# Patient Record
Sex: MALE | Race: OTHER | URBAN - METROPOLITAN AREA
[De-identification: names, ages, dates, MRNs, and addresses within clinical notes are randomized per-mention and may not be internally consistent; named-entity substitution may affect disease eponyms.]

---

## 2018-05-03 PROBLEM — Z00.00 ENCOUNTER FOR PREVENTIVE HEALTH EXAMINATION: Status: ACTIVE | Noted: 2018-05-03

## 2019-09-24 ENCOUNTER — INPATIENT (INPATIENT)
Facility: HOSPITAL | Age: 74
LOS: 1 days | Discharge: HOME | DRG: 313 | End: 2019-09-26
Attending: FAMILY MEDICINE | Admitting: FAMILY MEDICINE
Payer: MEDICARE

## 2019-09-24 VITALS — HEIGHT: 68 IN | WEIGHT: 169.98 LBS

## 2019-09-24 DIAGNOSIS — R07.9 CHEST PAIN, UNSPECIFIED: ICD-10-CM

## 2019-09-24 LAB
ALBUMIN SERPL ELPH-MCNC: 3.7 G/DL — SIGNIFICANT CHANGE UP (ref 3.3–5)
ALP SERPL-CCNC: 84 U/L — SIGNIFICANT CHANGE UP (ref 40–120)
ALT FLD-CCNC: 29 U/L — SIGNIFICANT CHANGE UP (ref 12–78)
ANION GAP SERPL CALC-SCNC: 11 MMOL/L — SIGNIFICANT CHANGE UP (ref 5–17)
APPEARANCE UR: CLEAR — SIGNIFICANT CHANGE UP
APTT BLD: 30.7 SEC — SIGNIFICANT CHANGE UP (ref 27.5–36.3)
AST SERPL-CCNC: 24 U/L — SIGNIFICANT CHANGE UP (ref 15–37)
BILIRUB SERPL-MCNC: 0.5 MG/DL — SIGNIFICANT CHANGE UP (ref 0.2–1.2)
BILIRUB UR-MCNC: NEGATIVE — SIGNIFICANT CHANGE UP
BUN SERPL-MCNC: 25 MG/DL — HIGH (ref 7–23)
CALCIUM SERPL-MCNC: 9.4 MG/DL — SIGNIFICANT CHANGE UP (ref 8.5–10.1)
CHLORIDE SERPL-SCNC: 106 MMOL/L — SIGNIFICANT CHANGE UP (ref 96–108)
CO2 SERPL-SCNC: 23 MMOL/L — SIGNIFICANT CHANGE UP (ref 22–31)
COLOR SPEC: YELLOW — SIGNIFICANT CHANGE UP
CREAT SERPL-MCNC: 1.38 MG/DL — HIGH (ref 0.5–1.3)
DIFF PNL FLD: NEGATIVE — SIGNIFICANT CHANGE UP
GLUCOSE SERPL-MCNC: 123 MG/DL — HIGH (ref 70–99)
GLUCOSE UR QL: NEGATIVE MG/DL — SIGNIFICANT CHANGE UP
HCT VFR BLD CALC: 43.4 % — SIGNIFICANT CHANGE UP (ref 39–50)
HGB BLD-MCNC: 14.4 G/DL — SIGNIFICANT CHANGE UP (ref 13–17)
INR BLD: 0.95 RATIO — SIGNIFICANT CHANGE UP (ref 0.88–1.16)
KETONES UR-MCNC: NEGATIVE — SIGNIFICANT CHANGE UP
LEUKOCYTE ESTERASE UR-ACNC: NEGATIVE — SIGNIFICANT CHANGE UP
MCHC RBC-ENTMCNC: 33.2 GM/DL — SIGNIFICANT CHANGE UP (ref 32–36)
MCHC RBC-ENTMCNC: 33.6 PG — SIGNIFICANT CHANGE UP (ref 27–34)
MCV RBC AUTO: 101.4 FL — HIGH (ref 80–100)
NITRITE UR-MCNC: NEGATIVE — SIGNIFICANT CHANGE UP
PH UR: 7 — SIGNIFICANT CHANGE UP (ref 5–8)
PLATELET # BLD AUTO: 114 K/UL — LOW (ref 150–400)
POTASSIUM SERPL-MCNC: 3.9 MMOL/L — SIGNIFICANT CHANGE UP (ref 3.5–5.3)
POTASSIUM SERPL-SCNC: 3.9 MMOL/L — SIGNIFICANT CHANGE UP (ref 3.5–5.3)
PROT SERPL-MCNC: 7.1 GM/DL — SIGNIFICANT CHANGE UP (ref 6–8.3)
PROT UR-MCNC: 15 MG/DL
PROTHROM AB SERPL-ACNC: 10.5 SEC — SIGNIFICANT CHANGE UP (ref 10–12.9)
RBC # BLD: 4.28 M/UL — SIGNIFICANT CHANGE UP (ref 4.2–5.8)
RBC # FLD: 13.9 % — SIGNIFICANT CHANGE UP (ref 10.3–14.5)
SODIUM SERPL-SCNC: 140 MMOL/L — SIGNIFICANT CHANGE UP (ref 135–145)
SP GR SPEC: 1.01 — SIGNIFICANT CHANGE UP (ref 1.01–1.02)
TROPONIN I SERPL-MCNC: 0.18 NG/ML — HIGH (ref 0.01–0.04)
UROBILINOGEN FLD QL: NEGATIVE MG/DL — SIGNIFICANT CHANGE UP
WBC # BLD: 6.46 K/UL — SIGNIFICANT CHANGE UP (ref 3.8–10.5)
WBC # FLD AUTO: 6.46 K/UL — SIGNIFICANT CHANGE UP (ref 3.8–10.5)

## 2019-09-24 PROCEDURE — 93010 ELECTROCARDIOGRAM REPORT: CPT

## 2019-09-24 PROCEDURE — 84484 ASSAY OF TROPONIN QUANT: CPT

## 2019-09-24 PROCEDURE — 86803 HEPATITIS C AB TEST: CPT

## 2019-09-24 PROCEDURE — 93018 CV STRESS TEST I&R ONLY: CPT

## 2019-09-24 PROCEDURE — 83036 HEMOGLOBIN GLYCOSYLATED A1C: CPT

## 2019-09-24 PROCEDURE — 93016 CV STRESS TEST SUPVJ ONLY: CPT

## 2019-09-24 PROCEDURE — 80048 BASIC METABOLIC PNL TOTAL CA: CPT

## 2019-09-24 PROCEDURE — 93306 TTE W/DOPPLER COMPLETE: CPT | Mod: 26

## 2019-09-24 PROCEDURE — 85025 COMPLETE CBC W/AUTO DIFF WBC: CPT

## 2019-09-24 PROCEDURE — 93017 CV STRESS TEST TRACING ONLY: CPT

## 2019-09-24 PROCEDURE — 70450 CT HEAD/BRAIN W/O DYE: CPT | Mod: 26

## 2019-09-24 PROCEDURE — 93306 TTE W/DOPPLER COMPLETE: CPT

## 2019-09-24 PROCEDURE — 71045 X-RAY EXAM CHEST 1 VIEW: CPT | Mod: 26

## 2019-09-24 PROCEDURE — A9500: CPT

## 2019-09-24 PROCEDURE — 78452 HT MUSCLE IMAGE SPECT MULT: CPT

## 2019-09-24 PROCEDURE — 36415 COLL VENOUS BLD VENIPUNCTURE: CPT

## 2019-09-24 PROCEDURE — 85027 COMPLETE CBC AUTOMATED: CPT

## 2019-09-24 PROCEDURE — 82962 GLUCOSE BLOOD TEST: CPT

## 2019-09-24 PROCEDURE — 80053 COMPREHEN METABOLIC PANEL: CPT

## 2019-09-24 RX ORDER — HEPARIN SODIUM 5000 [USP'U]/ML
5000 INJECTION INTRAVENOUS; SUBCUTANEOUS
Refills: 0 | Status: DISCONTINUED | OUTPATIENT
Start: 2019-09-24 | End: 2019-09-26

## 2019-09-24 RX ORDER — SODIUM CHLORIDE 9 MG/ML
1000 INJECTION INTRAMUSCULAR; INTRAVENOUS; SUBCUTANEOUS
Refills: 0 | Status: DISCONTINUED | OUTPATIENT
Start: 2019-09-24 | End: 2019-09-25

## 2019-09-24 RX ORDER — ASPIRIN/CALCIUM CARB/MAGNESIUM 324 MG
325 TABLET ORAL ONCE
Refills: 0 | Status: COMPLETED | OUTPATIENT
Start: 2019-09-24 | End: 2019-09-24

## 2019-09-24 RX ADMIN — Medication 325 MILLIGRAM(S): at 21:54

## 2019-09-24 NOTE — ED ADULT TRIAGE NOTE - BMI (KG/M2)
Pt calling stating she never heard back about getting her pain medication, she believes Hydrocodone and would like to know why she never received it. She said she talked to [facility] about medical marijuana but has not tried that route yet. Pt is also complaining about her hip still and wants to know why the xray is not showing anything. What can she do about the hip pain. Please advice pt what to do. 25.8

## 2019-09-24 NOTE — H&P ADULT - NSICDXPASTMEDICALHX_GEN_ALL_CORE_FT
PAST MEDICAL HISTORY:  CAD (coronary atherosclerotic disease)     DM2 (diabetes mellitus, type 2)     High cholesterol     History of BPH     HIV disease     Hypertension

## 2019-09-24 NOTE — ED ADULT NURSE NOTE - NSIMPLEMENTINTERV_GEN_ALL_ED
Implemented All Fall with Harm Risk Interventions:  [place] to call system. Call bell, personal items and telephone within reach. Instruct patient to call for assistance. Room bathroom lighting operational. Non-slip footwear when patient is off stretcher. Physically safe environment: no spills, clutter or unnecessary equipment. Stretcher in lowest position, wheels locked, appropriate side rails in place. Provide visual cue, wrist band, yellow gown, etc. Monitor gait and stability. Monitor for mental status changes and reorient to person, place, and time. Review medications for side effects contributing to fall risk. Reinforce activity limits and safety measures with patient and family. Provide visual clues: red socks.

## 2019-09-24 NOTE — ED ADULT NURSE NOTE - OBJECTIVE STATEMENT
Pt is from [place] visiting friends here in [place]. C/o headache & chest pain starting about 45mins ago, pt verbalized he thinks his blood pressure is high. Denies N/V. Denies SOB. No distress noted. No deficits noted. Takes ASA 81mg daily and took earlier this morning. Pt nonverbal and patting chest upon assessment for about 5 mins. Pt shortly after became lucid and verbal A&Ox4 at present.

## 2019-09-24 NOTE — ED ADULT NURSE NOTE - CHIEF COMPLAINT QUOTE
pt c/o high blood pressure, chest pain, sob x 30 min. pt c/o strange feeling in the head, weakness in both legs, unable to stand and very shaky. no resp distress, speaking full sentences, no facial droop, equal hand  strength.

## 2019-09-24 NOTE — ED PROVIDER NOTE - OBJECTIVE STATEMENT
73 y/o m with PMHx of HTN, HLD, DM presenting to the ED c/o HA, dizziness, CP x30 minutes.  Pt is visiting from [place], found blood pressure to be high. Pain described as pressure. States he has "strange feeling in the head", weakness/numbness in both legs, unable to stand, very shaky. No hx of similar sx in the past. Denies fever, chills, abd pain, n/v/d, any other acute c/o. Nonsmoker, no EtOH use or recreational drug use.

## 2019-09-24 NOTE — H&P ADULT - NSICDXPASTSURGICALHX_GEN_ALL_CORE_FT
PAST SURGICAL HISTORY:  H/O inguinal hernia repair     History of back surgery     History of right knee joint replacement

## 2019-09-24 NOTE — H&P ADULT - ASSESSMENT
74 year old male pt with CAD presenting with Chest pain and associated  dizziness/headache    -Admit to Telemetry      #Chest pain/ R/O ACS  -currently asymptomatic  -EKG noted for LBBB. No comparison EKG on file  -NPO for NM stress test  -trop 0.183  -serial troponins  -serial EKGs  -f/u TTE  -Cardiology consult pending    #CAD  -on ASA, statin and BB    Dizziness Headache  -currently asymptomatic  -CT head negative for acute pathology but age indeterminate infarct noted    #Presumed CHF  -f/u echo  -on entresto    #HTN  -on BB and Imdur    #JADE  -baseline kidney function unknown  -IVF hydration  -trend kidney function    #HIV  -on anti-retroviral    #BPH  -on tamsulosin and finasteride    #DM2  -hold oral hypoglycemics  -ISS    #Advanced directives  -full code    #DVT ppx  -heparin sq

## 2019-09-24 NOTE — ED PROVIDER NOTE - ENMT, MLM
Airway patent, Nasal mucosa clear. Dry mucous membranes.  Throat has no vesicles, no oropharyngeal exudates and uvula is midline.

## 2019-09-24 NOTE — H&P ADULT - NSHPPHYSICALEXAM_GEN_ALL_CORE
Vital Signs Last 24 Hrs  T(C): 36.6 (24 Sep 2019 23:26), Max: 36.6 (24 Sep 2019 23:26)  T(F): 97.9 (24 Sep 2019 23:26), Max: 97.9 (24 Sep 2019 23:26)  HR: 72 (24 Sep 2019 23:26) (72 - 91)  BP: 156/94 (24 Sep 2019 23:26) (150/99 - 183/118)  BP(mean): --  RR: 18 (24 Sep 2019 23:26) (18 - 18)  SpO2: 99% (24 Sep 2019 23:26) (98% - 99%)

## 2019-09-24 NOTE — ED ADULT TRIAGE NOTE - CHIEF COMPLAINT QUOTE
pt c/o high blood pressure, chest pain, sob x 30 min. pt c/o strange feeling in the head, weakness in both legs, unable to stand and very shaky. no resp distress, speaking full sentences, no facial droop. pt c/o high blood pressure, chest pain, sob x 30 min. pt c/o strange feeling in the head, weakness in both legs, unable to stand and very shaky. no resp distress, speaking full sentences, no facial droop, equal hand  strength.

## 2019-09-24 NOTE — H&P ADULT - HISTORY OF PRESENT ILLNESS
74 year old male patient with pertinent medical hx of Cardiac hx(unknown), HTN, HLD, DM2 presented to the ED complaining of sudden onset chest pain while at rest. Patient endorsed a constant left sided chest pressure associated with dizziness, headache weakness and lower extremity tingling.  Denies any aggravating or alleviating factors. Patient had recently consumed some wine and cheese. Denies any abdominal pain, nausea, vomiting, coughing, sob fever or chills. Patient visiting the area. PCP and Cardiologist both in [facility]      In the ED patient was found to have a LBBB on EKG and a trop of 0.183

## 2019-09-24 NOTE — ED PROVIDER NOTE - RADIATION
During the administration of the ordered medication, Ibuprofen the potential side effects were discussed with the patient/guardian.   
no radiation

## 2019-09-25 DIAGNOSIS — Z98.890 OTHER SPECIFIED POSTPROCEDURAL STATES: Chronic | ICD-10-CM

## 2019-09-25 DIAGNOSIS — E78.00 PURE HYPERCHOLESTEROLEMIA, UNSPECIFIED: ICD-10-CM

## 2019-09-25 DIAGNOSIS — Z96.651 PRESENCE OF RIGHT ARTIFICIAL KNEE JOINT: Chronic | ICD-10-CM

## 2019-09-25 DIAGNOSIS — R07.9 CHEST PAIN, UNSPECIFIED: ICD-10-CM

## 2019-09-25 DIAGNOSIS — I10 ESSENTIAL (PRIMARY) HYPERTENSION: ICD-10-CM

## 2019-09-25 LAB
ALBUMIN SERPL ELPH-MCNC: 3.4 G/DL — SIGNIFICANT CHANGE UP (ref 3.3–5)
ALP SERPL-CCNC: 64 U/L — SIGNIFICANT CHANGE UP (ref 40–120)
ALT FLD-CCNC: 25 U/L — SIGNIFICANT CHANGE UP (ref 12–78)
ANION GAP SERPL CALC-SCNC: 6 MMOL/L — SIGNIFICANT CHANGE UP (ref 5–17)
AST SERPL-CCNC: 22 U/L — SIGNIFICANT CHANGE UP (ref 15–37)
BASOPHILS # BLD AUTO: 0 K/UL — SIGNIFICANT CHANGE UP (ref 0–0.2)
BASOPHILS NFR BLD AUTO: 0 % — SIGNIFICANT CHANGE UP (ref 0–2)
BILIRUB SERPL-MCNC: 0.6 MG/DL — SIGNIFICANT CHANGE UP (ref 0.2–1.2)
BUN SERPL-MCNC: 21 MG/DL — SIGNIFICANT CHANGE UP (ref 7–23)
CALCIUM SERPL-MCNC: 8.8 MG/DL — SIGNIFICANT CHANGE UP (ref 8.5–10.1)
CHLORIDE SERPL-SCNC: 110 MMOL/L — HIGH (ref 96–108)
CO2 SERPL-SCNC: 26 MMOL/L — SIGNIFICANT CHANGE UP (ref 22–31)
CREAT SERPL-MCNC: 1.2 MG/DL — SIGNIFICANT CHANGE UP (ref 0.5–1.3)
EOSINOPHIL # BLD AUTO: 0.06 K/UL — SIGNIFICANT CHANGE UP (ref 0–0.5)
EOSINOPHIL NFR BLD AUTO: 1 % — SIGNIFICANT CHANGE UP (ref 0–6)
GLUCOSE SERPL-MCNC: 94 MG/DL — SIGNIFICANT CHANGE UP (ref 70–99)
HBA1C BLD-MCNC: 5.4 % — SIGNIFICANT CHANGE UP (ref 4–5.6)
HCT VFR BLD CALC: 42.6 % — SIGNIFICANT CHANGE UP (ref 39–50)
HCV AB S/CO SERPL IA: 0.14 S/CO — SIGNIFICANT CHANGE UP (ref 0–0.99)
HCV AB SERPL-IMP: SIGNIFICANT CHANGE UP
HGB BLD-MCNC: 14.1 G/DL — SIGNIFICANT CHANGE UP (ref 13–17)
LYMPHOCYTES # BLD AUTO: 2.17 K/UL — SIGNIFICANT CHANGE UP (ref 1–3.3)
LYMPHOCYTES # BLD AUTO: 36 % — SIGNIFICANT CHANGE UP (ref 13–44)
MCHC RBC-ENTMCNC: 33.1 GM/DL — SIGNIFICANT CHANGE UP (ref 32–36)
MCHC RBC-ENTMCNC: 33.5 PG — SIGNIFICANT CHANGE UP (ref 27–34)
MCV RBC AUTO: 101.2 FL — HIGH (ref 80–100)
MONOCYTES # BLD AUTO: 0.36 K/UL — SIGNIFICANT CHANGE UP (ref 0–0.9)
MONOCYTES NFR BLD AUTO: 6 % — SIGNIFICANT CHANGE UP (ref 2–14)
NEUTROPHILS # BLD AUTO: 3.25 K/UL — SIGNIFICANT CHANGE UP (ref 1.8–7.4)
NEUTROPHILS NFR BLD AUTO: 54 % — SIGNIFICANT CHANGE UP (ref 43–77)
NRBC # BLD: SIGNIFICANT CHANGE UP /100 WBCS (ref 0–0)
PLATELET # BLD AUTO: 105 K/UL — LOW (ref 150–400)
POTASSIUM SERPL-MCNC: 3.7 MMOL/L — SIGNIFICANT CHANGE UP (ref 3.5–5.3)
POTASSIUM SERPL-SCNC: 3.7 MMOL/L — SIGNIFICANT CHANGE UP (ref 3.5–5.3)
PROT SERPL-MCNC: 6.3 GM/DL — SIGNIFICANT CHANGE UP (ref 6–8.3)
RBC # BLD: 4.21 M/UL — SIGNIFICANT CHANGE UP (ref 4.2–5.8)
RBC # FLD: 13.7 % — SIGNIFICANT CHANGE UP (ref 10.3–14.5)
SODIUM SERPL-SCNC: 142 MMOL/L — SIGNIFICANT CHANGE UP (ref 135–145)
TROPONIN I SERPL-MCNC: 0.36 NG/ML — HIGH (ref 0.01–0.04)
WBC # BLD: 6.02 K/UL — SIGNIFICANT CHANGE UP (ref 3.8–10.5)
WBC # FLD AUTO: 6.02 K/UL — SIGNIFICANT CHANGE UP (ref 3.8–10.5)

## 2019-09-25 PROCEDURE — 99223 1ST HOSP IP/OBS HIGH 75: CPT

## 2019-09-25 RX ORDER — ASPIRIN/CALCIUM CARB/MAGNESIUM 324 MG
81 TABLET ORAL DAILY
Refills: 0 | Status: DISCONTINUED | OUTPATIENT
Start: 2019-09-25 | End: 2019-09-26

## 2019-09-25 RX ORDER — POTASSIUM CITRATE MONOHYDRATE 100 %
1 POWDER (GRAM) MISCELLANEOUS
Qty: 0 | Refills: 0 | DISCHARGE

## 2019-09-25 RX ORDER — DOLUTEGRAVIR SODIUM 25 MG/1
1 TABLET, FILM COATED ORAL
Qty: 0 | Refills: 0 | DISCHARGE

## 2019-09-25 RX ORDER — DEXTROSE 50 % IN WATER 50 %
15 SYRINGE (ML) INTRAVENOUS ONCE
Refills: 0 | Status: DISCONTINUED | OUTPATIENT
Start: 2019-09-25 | End: 2019-09-26

## 2019-09-25 RX ORDER — CARVEDILOL PHOSPHATE 80 MG/1
1 CAPSULE, EXTENDED RELEASE ORAL
Qty: 0 | Refills: 0 | DISCHARGE

## 2019-09-25 RX ORDER — SACUBITRIL AND VALSARTAN 24; 26 MG/1; MG/1
1 TABLET, FILM COATED ORAL
Qty: 0 | Refills: 0 | DISCHARGE

## 2019-09-25 RX ORDER — LOPERAMIDE HCL 2 MG
1 TABLET ORAL
Qty: 0 | Refills: 0 | DISCHARGE

## 2019-09-25 RX ORDER — ATORVASTATIN CALCIUM 80 MG/1
1 TABLET, FILM COATED ORAL
Qty: 0 | Refills: 0 | DISCHARGE

## 2019-09-25 RX ORDER — ISOSORBIDE MONONITRATE 60 MG/1
30 TABLET, EXTENDED RELEASE ORAL DAILY
Refills: 0 | Status: DISCONTINUED | OUTPATIENT
Start: 2019-09-25 | End: 2019-09-26

## 2019-09-25 RX ORDER — DEXTROSE 50 % IN WATER 50 %
25 SYRINGE (ML) INTRAVENOUS ONCE
Refills: 0 | Status: DISCONTINUED | OUTPATIENT
Start: 2019-09-25 | End: 2019-09-26

## 2019-09-25 RX ORDER — DARUNAVIR 75 MG/1
800 TABLET, FILM COATED ORAL DAILY
Refills: 0 | Status: DISCONTINUED | OUTPATIENT
Start: 2019-09-25 | End: 2019-09-26

## 2019-09-25 RX ORDER — DOLUTEGRAVIR SODIUM 25 MG/1
50 TABLET, FILM COATED ORAL DAILY
Refills: 0 | Status: DISCONTINUED | OUTPATIENT
Start: 2019-09-25 | End: 2019-09-26

## 2019-09-25 RX ORDER — ISOSORBIDE MONONITRATE 60 MG/1
1 TABLET, EXTENDED RELEASE ORAL
Qty: 0 | Refills: 0 | DISCHARGE

## 2019-09-25 RX ORDER — LAMIVUDINE 150 MG
300 TABLET ORAL DAILY
Refills: 0 | Status: DISCONTINUED | OUTPATIENT
Start: 2019-09-25 | End: 2019-09-26

## 2019-09-25 RX ORDER — CARVEDILOL PHOSPHATE 80 MG/1
3.12 CAPSULE, EXTENDED RELEASE ORAL
Refills: 0 | Status: DISCONTINUED | OUTPATIENT
Start: 2019-09-25 | End: 2019-09-26

## 2019-09-25 RX ORDER — DEXTROSE 50 % IN WATER 50 %
12.5 SYRINGE (ML) INTRAVENOUS ONCE
Refills: 0 | Status: DISCONTINUED | OUTPATIENT
Start: 2019-09-25 | End: 2019-09-26

## 2019-09-25 RX ORDER — GLUCAGON INJECTION, SOLUTION 0.5 MG/.1ML
1 INJECTION, SOLUTION SUBCUTANEOUS ONCE
Refills: 0 | Status: DISCONTINUED | OUTPATIENT
Start: 2019-09-25 | End: 2019-09-26

## 2019-09-25 RX ORDER — INSULIN LISPRO 100/ML
VIAL (ML) SUBCUTANEOUS
Refills: 0 | Status: DISCONTINUED | OUTPATIENT
Start: 2019-09-25 | End: 2019-09-26

## 2019-09-25 RX ORDER — ATORVASTATIN CALCIUM 80 MG/1
40 TABLET, FILM COATED ORAL DAILY
Refills: 0 | Status: DISCONTINUED | OUTPATIENT
Start: 2019-09-25 | End: 2019-09-26

## 2019-09-25 RX ORDER — DARUNAVIR 75 MG/1
1 TABLET, FILM COATED ORAL
Qty: 0 | Refills: 0 | DISCHARGE

## 2019-09-25 RX ORDER — ASPIRIN/CALCIUM CARB/MAGNESIUM 324 MG
1 TABLET ORAL
Qty: 0 | Refills: 0 | DISCHARGE

## 2019-09-25 RX ORDER — SACUBITRIL AND VALSARTAN 24; 26 MG/1; MG/1
1 TABLET, FILM COATED ORAL
Refills: 0 | Status: DISCONTINUED | OUTPATIENT
Start: 2019-09-25 | End: 2019-09-26

## 2019-09-25 RX ORDER — RITONAVIR 100 MG/1
100 TABLET, FILM COATED ORAL DAILY
Refills: 0 | Status: DISCONTINUED | OUTPATIENT
Start: 2019-09-25 | End: 2019-09-26

## 2019-09-25 RX ORDER — INFLUENZA VIRUS VACCINE 15; 15; 15; 15 UG/.5ML; UG/.5ML; UG/.5ML; UG/.5ML
0.5 SUSPENSION INTRAMUSCULAR ONCE
Refills: 0 | Status: DISCONTINUED | OUTPATIENT
Start: 2019-09-25 | End: 2019-09-26

## 2019-09-25 RX ORDER — TAMSULOSIN HYDROCHLORIDE 0.4 MG/1
1 CAPSULE ORAL
Qty: 0 | Refills: 0 | DISCHARGE

## 2019-09-25 RX ORDER — GLYBURIDE 5 MG
1 TABLET ORAL
Qty: 0 | Refills: 0 | DISCHARGE

## 2019-09-25 RX ORDER — TAMSULOSIN HYDROCHLORIDE 0.4 MG/1
0.4 CAPSULE ORAL AT BEDTIME
Refills: 0 | Status: DISCONTINUED | OUTPATIENT
Start: 2019-09-25 | End: 2019-09-26

## 2019-09-25 RX ORDER — SODIUM CHLORIDE 9 MG/ML
1000 INJECTION, SOLUTION INTRAVENOUS
Refills: 0 | Status: DISCONTINUED | OUTPATIENT
Start: 2019-09-25 | End: 2019-09-26

## 2019-09-25 RX ORDER — LAMIVUDINE 150 MG
1 TABLET ORAL
Qty: 0 | Refills: 0 | DISCHARGE

## 2019-09-25 RX ORDER — FINASTERIDE 5 MG/1
1 TABLET, FILM COATED ORAL
Qty: 0 | Refills: 0 | DISCHARGE

## 2019-09-25 RX ORDER — FINASTERIDE 5 MG/1
5 TABLET, FILM COATED ORAL DAILY
Refills: 0 | Status: DISCONTINUED | OUTPATIENT
Start: 2019-09-25 | End: 2019-09-26

## 2019-09-25 RX ORDER — RITONAVIR 100 MG/1
1 TABLET, FILM COATED ORAL
Qty: 0 | Refills: 0 | DISCHARGE

## 2019-09-25 RX ADMIN — SACUBITRIL AND VALSARTAN 1 TABLET(S): 24; 26 TABLET, FILM COATED ORAL at 06:39

## 2019-09-25 RX ADMIN — SACUBITRIL AND VALSARTAN 1 TABLET(S): 24; 26 TABLET, FILM COATED ORAL at 22:49

## 2019-09-25 RX ADMIN — FINASTERIDE 5 MILLIGRAM(S): 5 TABLET, FILM COATED ORAL at 13:50

## 2019-09-25 RX ADMIN — HEPARIN SODIUM 5000 UNIT(S): 5000 INJECTION INTRAVENOUS; SUBCUTANEOUS at 17:49

## 2019-09-25 RX ADMIN — DARUNAVIR 800 MILLIGRAM(S): 75 TABLET, FILM COATED ORAL at 17:49

## 2019-09-25 RX ADMIN — TAMSULOSIN HYDROCHLORIDE 0.4 MILLIGRAM(S): 0.4 CAPSULE ORAL at 22:48

## 2019-09-25 RX ADMIN — Medication 81 MILLIGRAM(S): at 13:50

## 2019-09-25 RX ADMIN — HEPARIN SODIUM 5000 UNIT(S): 5000 INJECTION INTRAVENOUS; SUBCUTANEOUS at 06:39

## 2019-09-25 RX ADMIN — SODIUM CHLORIDE 100 MILLILITER(S): 9 INJECTION INTRAMUSCULAR; INTRAVENOUS; SUBCUTANEOUS at 02:28

## 2019-09-25 RX ADMIN — Medication 300 MILLIGRAM(S): at 13:51

## 2019-09-25 RX ADMIN — ATORVASTATIN CALCIUM 40 MILLIGRAM(S): 80 TABLET, FILM COATED ORAL at 22:49

## 2019-09-25 RX ADMIN — CARVEDILOL PHOSPHATE 3.12 MILLIGRAM(S): 80 CAPSULE, EXTENDED RELEASE ORAL at 17:49

## 2019-09-25 RX ADMIN — DOLUTEGRAVIR SODIUM 50 MILLIGRAM(S): 25 TABLET, FILM COATED ORAL at 22:49

## 2019-09-25 RX ADMIN — ISOSORBIDE MONONITRATE 30 MILLIGRAM(S): 60 TABLET, EXTENDED RELEASE ORAL at 13:49

## 2019-09-25 RX ADMIN — RITONAVIR 100 MILLIGRAM(S): 100 TABLET, FILM COATED ORAL at 22:49

## 2019-09-25 NOTE — CHART NOTE - NSCHARTNOTEFT_GEN_A_CORE
Called to evaluate for positive tropinin with LBBB on ECG.  No prior ECG available. Stat bedside echo done and reviewed. LV FX and wall motion WNL.  Full report to follow.  Full consult in Am.

## 2019-09-25 NOTE — PROGRESS NOTE ADULT - SUBJECTIVE AND OBJECTIVE BOX
74 year old male patient with pertinent medical hx of Cardiac hx(unknown), HTN, HLD, DM2 presented to the ED complaining of sudden onset chest pain while at rest. Patient endorsed a constant left sided chest pressure associated with dizziness, headache weakness and lower extremity tingling.  Denies any aggravating or alleviating factors. Patient had recently consumed some wine and cheese. Denies any abdominal pain, nausea, vomiting, coughing, sob fever or chills. Patient visiting the area. PCP and Cardiologist both in [facility]. ED patient was found to have a LBBB on EKG and a trop of 0.183     19 Pt     Review of Symptoms  Gen: no fevers, chills, sweats, weight loss, fatigue  Visual: no recent changes in vision, no blurriness, no seeing spots  Cardiovascular: no chest pain, no palpitations, no orthopnea, no leg swelling  Respiratory: no shortness of breath, no exertional dyspnea, no cough, no rhinorrhea, no nasal congestion  GI: no difficulty swallowing, no nausea, no vomiting, no abdominal pain, no diarrhea, no constipation, no melana  : no dysuria, no increased freq, no hematuria, no malodorous urine  Derm: no wounds, no rashes  Heme: no easy bleeding or bruising  MSK: no joint pain, no joint swelling or redness, no extremity pain   Neuro: no headache, no numbness, no weakness, no memory loss  Psych: no depression, no anxiety, no SI    MEDICATIONS  (STANDING):  aspirin  chewable 81 milliGRAM(s) Oral daily  atorvastatin Oral Tab/Cap - Peds 40 milliGRAM(s) Oral daily  carvedilol Oral Tab/Cap - Peds 3.125 milliGRAM(s) Oral two times a day  darunavir 800 milliGRAM(s) Oral daily  dextrose 5%. 1000 milliLiter(s) (50 mL/Hr) IV Continuous <Continuous>  dextrose 50% Injectable 12.5 Gram(s) IV Push once  dextrose 50% Injectable 25 Gram(s) IV Push once  dextrose 50% Injectable 25 Gram(s) IV Push once  dolutegravir 50 milliGRAM(s) Oral daily  finasteride 5 milliGRAM(s) Oral daily  heparin  Injectable 5000 Unit(s) SubCutaneous two times a day  influenza   Vaccine 0.5 milliLiter(s) IntraMuscular once  insulin lispro (HumaLOG) corrective regimen sliding scale   SubCutaneous three times a day before meals  isosorbide   mononitrate ER Tablet (IMDUR) 30 milliGRAM(s) Oral daily  lamiVUDine 300 milliGRAM(s) Oral daily  ritonavir Tablet 100 milliGRAM(s) Oral daily  sacubitril 24 mG/valsartan 26 mG 1 Tablet(s) Oral two times a day  tamsulosin 0.4 milliGRAM(s) Oral at bedtime    MEDICATIONS  (PRN):  dextrose 40% Gel 15 Gram(s) Oral once PRN Blood Glucose LESS THAN 70 milliGRAM(s)/deciliter  glucagon  Injectable 1 milliGRAM(s) IntraMuscular once PRN Glucose LESS THAN 70 milligrams/deciliter      Vital Signs Last 24 Hrs  T(C): 36.8 (25 Sep 2019 10:41), Max: 36.8 (25 Sep 2019 01:55)  T(F): 98.2 (25 Sep 2019 10:41), Max: 98.2 (25 Sep 2019 01:55)  HR: 55 (25 Sep 2019 10:41) (55 - 91)  BP: 128/76 (25 Sep 2019 10:41) (128/76 - 183/118)  BP(mean): --  RR: 18 (25 Sep 2019 10:41) (18 - 18)  SpO2: 98% (25 Sep 2019 10:41) (95% - 99%)    GEN: NAD, comfortable, resting in bed  HEENT: NC/AT, EOMI, PERRLA, MMM, clear conjunctiva and sclera, normal hearing, no nasal discharge, throat clear, no thrush, normal dentition.   NECK: supple, no JVD, no LAD, no thyromegaly  BACK:  ROM intact, no spinal/paraspinal tenderness  CV: S1S2, RRR, no mumur  RESP: good air movement, CTABL, no rales, rhonchi or wheezing, respirations unlabored  ABD: +BS, soft, ND, NT, no guarding, no rigidity, no HSM  EXT: +2 radial and pedal pulses, no edema, no calve tenderness  SKIN: No visible Rashes or Ulcers  MSK: ROM intact in all extremities  NEURO:  sensory and CN 2-12 Grossly intact, no motor deficits, no, saddle anesthesia, AOx3  PSYCH: normal behavior         LABS:                          14.1   6.02  )-----------( 105      ( 25 Sep 2019 07:38 )             42.6     25 Sep 2019 07:38    142    |  110    |  21     ----------------------------<  94     3.7     |  26     |  1.20     Ca    8.8        25 Sep 2019 07:38    TPro  6.3    /  Alb  3.4    /  TBili  0.6    /  DBili  x      /  AST  22     /  ALT  25     /  AlkPhos  64     25 Sep 2019 07:38    LIVER FUNCTIONS - ( 25 Sep 2019 07:38 )  Alb: 3.4 g/dL / Pro: 6.3 gm/dL / ALK PHOS: 64 U/L / ALT: 25 U/L / AST: 22 U/L / GGT: x           PT/INR - ( 24 Sep 2019 20:11 )   PT: 10.5 sec;   INR: 0.95 ratio         PTT - ( 24 Sep 2019 20:11 )  PTT:30.7 sec  CAPILLARY BLOOD GLUCOSE      POCT Blood Glucose.: 99 mg/dL (25 Sep 2019 17:47)  POCT Blood Glucose.: 93 mg/dL (25 Sep 2019 12:52)  POCT Blood Glucose.: 106 mg/dL (25 Sep 2019 08:13)  POCT Blood Glucose.: 99 mg/dL (25 Sep 2019 06:37)    CARDIAC MARKERS ( 25 Sep 2019 07:38 )  0.360 ng/mL / x     / x     / x     / x      CARDIAC MARKERS ( 24 Sep 2019 23:26 )  0.215 ng/mL / x     / x     / x     / x      CARDIAC MARKERS ( 24 Sep 2019 20:11 )  0.183 ng/mL / x     / x     / x     / x          Urinalysis Basic - ( 24 Sep 2019 20:01 )    Color: Yellow / Appearance: Clear / S.010 / pH: x  Gluc: x / Ketone: Negative  / Bili: Negative / Urobili: Negative mg/dL   Blood: x / Protein: 15 mg/dL / Nitrite: Negative   Leuk Esterase: Negative / RBC: Negative /HPF / WBC Negative   Sq Epi: x / Non Sq Epi: Occasional / Bacteria: Few        RADIOLOGY: 74 year old male patient with pertinent medical hx of Cardiac hx(unknown), HTN, HLD, DM2 presented to the ED complaining of sudden onset chest pain while at rest. Patient endorsed a constant left sided chest pressure associated with dizziness, headache weakness and lower extremity tingling.  Denies any aggravating or alleviating factors. Patient had recently consumed some wine and cheese. Denies any abdominal pain, nausea, vomiting, coughing, sob fever or chills. Patient visiting the area. PCP and Cardiologist both in [facility]. ED patient was found to have a LBBB on EKG and a trop of 0.183     19 Pt seen and examined at bedside. AOx3 in NAD. Denies having any CP, palpitations, SOB, dizziness, HA at this time. As per pt, had cath in  at [facility] in [facility] which showed no disease and no stents were placed at that time. Pt is HIV+ well controlled on anti-retroviral therapy. Will go for Stress Test tomorrow.       ROS  Gen: no fevers, chills, sweats, weight loss, fatigue  Visual: no recent changes in vision, no blurriness, no seeing spots  Cardiovascular: no chest pain, no palpitations, no orthopnea, no leg swelling  Respiratory: no shortness of breath, no exertional dyspnea, no cough, no rhinorrhea, no nasal congestion  GI: no difficulty swallowing, no nausea, no vomiting, no abdominal pain, no diarrhea, no constipation, no melana  : no dysuria, no increased freq, no hematuria  MSK: no joint pain, no joint swelling or redness, no extremity pain   Neuro: no headache, no numbness, no weakness, no memory loss  Psych: no depression, no anxiety, no SI    MEDICATIONS  (STANDING):  aspirin  chewable 81 milliGRAM(s) Oral daily  atorvastatin Oral Tab/Cap - Peds 40 milliGRAM(s) Oral daily  carvedilol Oral Tab/Cap - Peds 3.125 milliGRAM(s) Oral two times a day  darunavir 800 milliGRAM(s) Oral daily  dextrose 5%. 1000 milliLiter(s) (50 mL/Hr) IV Continuous <Continuous>  dextrose 50% Injectable 12.5 Gram(s) IV Push once  dextrose 50% Injectable 25 Gram(s) IV Push once  dextrose 50% Injectable 25 Gram(s) IV Push once  dolutegravir 50 milliGRAM(s) Oral daily  finasteride 5 milliGRAM(s) Oral daily  heparin  Injectable 5000 Unit(s) SubCutaneous two times a day  influenza   Vaccine 0.5 milliLiter(s) IntraMuscular once  insulin lispro (HumaLOG) corrective regimen sliding scale   SubCutaneous three times a day before meals  isosorbide   mononitrate ER Tablet (IMDUR) 30 milliGRAM(s) Oral daily  lamiVUDine 300 milliGRAM(s) Oral daily  ritonavir Tablet 100 milliGRAM(s) Oral daily  sacubitril 24 mG/valsartan 26 mG 1 Tablet(s) Oral two times a day  tamsulosin 0.4 milliGRAM(s) Oral at bedtime    MEDICATIONS  (PRN):  dextrose 40% Gel 15 Gram(s) Oral once PRN Blood Glucose LESS THAN 70 milliGRAM(s)/deciliter  glucagon  Injectable 1 milliGRAM(s) IntraMuscular once PRN Glucose LESS THAN 70 milligrams/deciliter      Vital Signs Last 24 Hrs  T(C): 36.8 (25 Sep 2019 10:41), Max: 36.8 (25 Sep 2019 01:55)  T(F): 98.2 (25 Sep 2019 10:41), Max: 98.2 (25 Sep 2019 01:55)  HR: 55 (25 Sep 2019 10:41) (55 - 91)  BP: 128/76 (25 Sep 2019 10:41) (128/76 - 183/118)  BP(mean): --  RR: 18 (25 Sep 2019 10:41) (18 - 18)  SpO2: 98% (25 Sep 2019 10:41) (95% - 99%)    PE  GEN: NAD, comfortable, resting in bed  CV: S1S2, RRR, no mumur  RESP: good air movement, CTABL, no rales, rhonchi or wheezing, respirations unlabored  ABD: +BS, soft, ND, NT, no guarding, no rigidity, no HSM  EXT: +2 radial and pedal pulses, no edema, no calve tenderness  SKIN: No visible Rashes or Ulcers  MSK: ROM intact in all extremities  NEURO:  sensory and CN 2-12 Grossly intact, no motor deficits, AOx3  PSYCH: normal behavior         LABS:                          14.1   6.02  )-----------( 105      ( 25 Sep 2019 07:38 )             42.6     25 Sep 2019 07:38    142    |  110    |  21     ----------------------------<  94     3.7     |  26     |  1.20     Ca    8.8        25 Sep 2019 07:38    TPro  6.3    /  Alb  3.4    /  TBili  0.6    /  DBili  x      /  AST  22     /  ALT  25     /  AlkPhos  64     25 Sep 2019 07:38    LIVER FUNCTIONS - ( 25 Sep 2019 07:38 )  Alb: 3.4 g/dL / Pro: 6.3 gm/dL / ALK PHOS: 64 U/L / ALT: 25 U/L / AST: 22 U/L / GGT: x           PT/INR - ( 24 Sep 2019 20:11 )   PT: 10.5 sec;   INR: 0.95 ratio         PTT - ( 24 Sep 2019 20:11 )  PTT:30.7 sec  CAPILLARY BLOOD GLUCOSE      POCT Blood Glucose.: 99 mg/dL (25 Sep 2019 17:47)  POCT Blood Glucose.: 93 mg/dL (25 Sep 2019 12:52)  POCT Blood Glucose.: 106 mg/dL (25 Sep 2019 08:13)  POCT Blood Glucose.: 99 mg/dL (25 Sep 2019 06:37)    CARDIAC MARKERS ( 25 Sep 2019 07:38 )  0.360 ng/mL / x     / x     / x     / x      CARDIAC MARKERS ( 24 Sep 2019 23:26 )  0.215 ng/mL / x     / x     / x     / x      CARDIAC MARKERS ( 24 Sep 2019 20:11 )  0.183 ng/mL / x     / x     / x     / x          Urinalysis Basic - ( 24 Sep 2019 20:01 )    Color: Yellow / Appearance: Clear / S.010 / pH: x  Gluc: x / Ketone: Negative  / Bili: Negative / Urobili: Negative mg/dL   Blood: x / Protein: 15 mg/dL / Nitrite: Negative   Leuk Esterase: Negative / RBC: Negative /HPF / WBC Negative   Sq Epi: x / Non Sq Epi: Occasional / Bacteria: Few        RADIOLOGY:    < from: Transthoracic Echocardiogram (19 @ 22:40) >  Impression     Summary     The left ventricle is normal in size and wall thickness.   Estimated left ventricular ejection fraction is 55 %.   Minimal abnormal septal wall motion c/w bundle branch block.   Trace pericardial effusion cannot be ruled out    < from: CT Head No Cont (19 @ 21:35) >  IMPRESSION:    No acute intracranial hemorrhage, mass effect, or CT evidence of a large   vascular territory infarct.Indeterminate age lacunar infarcts in   bilateral external capsules, appears remote on the left side. If symptoms   persist, consider short-term follow-up or MRI may be obtained for further   evaluation provided no MR contraindications.    < from: Xray Chest 1 View- PORTABLE-Urgent (19 @ 20:54) >  IMPRESSION:  Only minimal atelectatic changes in the left lung base due to the poor   inspiratory effort, otherwise,no acute cardiopulmonary findings.

## 2019-09-25 NOTE — CONSULT NOTE ADULT - SUBJECTIVE AND OBJECTIVE BOX
PCP:    REQUESTING PHYSICIAN:    REASON FOR CONSULT:    CHIEF COMPLAINT:    HPI:  74 year old male patient with pertinent medical hx of cardiac evaluation at [facility] including cardiac cath which the patient was told that his "arteries were clean" also, HTN, HLD, DM2 presented to ED after having a small glass of wine with a friend and noticed that he felt weak and cold. . Patient endorsed  dizziness, headache weakness and lower extremity tingling.   Although HP reported chest pain, pt denies chest pain or pressure. Pt denies exertional discomfort or dyspnea. Denies any abdominal pain, nausea, vomiting, coughing, sob fever or chills. Patient visiting the area. PCP and Cardiologist both in [facility]. His cardiologist was called, Dr Bruner , but he was not available.       In the ED patient was found to have a LBBB on EKG and a trop of 0.183 (24 Sep 2019 23:40)      PAST MEDICAL & SURGICAL HISTORY:  CAD (coronary atherosclerotic disease)  DM2 (diabetes mellitus, type 2)  HIV disease  History of BPH  High cholesterol  Hypertension  History of back surgery  History of right knee joint replacement  H/O inguinal hernia repair      Allergies    No Known Allergies    Intolerances        SOCIAL HISTORY:    FAMILY HISTORY:  FH: hypertension      MEDICATIONS:  MEDICATIONS  (STANDING):  aspirin  chewable 81 milliGRAM(s) Oral daily  atorvastatin Oral Tab/Cap - Peds 40 milliGRAM(s) Oral daily  carvedilol Oral Tab/Cap - Peds 3.125 milliGRAM(s) Oral two times a day  darunavir 800 milliGRAM(s) Oral daily  dextrose 5%. 1000 milliLiter(s) (50 mL/Hr) IV Continuous <Continuous>  dextrose 50% Injectable 12.5 Gram(s) IV Push once  dextrose 50% Injectable 25 Gram(s) IV Push once  dextrose 50% Injectable 25 Gram(s) IV Push once  dolutegravir 50 milliGRAM(s) Oral daily  finasteride 5 milliGRAM(s) Oral daily  heparin  Injectable 5000 Unit(s) SubCutaneous two times a day  influenza   Vaccine 0.5 milliLiter(s) IntraMuscular once  insulin lispro (HumaLOG) corrective regimen sliding scale   SubCutaneous three times a day before meals  isosorbide   mononitrate ER Tablet (IMDUR) 30 milliGRAM(s) Oral daily  lamiVUDine 300 milliGRAM(s) Oral daily  ritonavir Tablet 100 milliGRAM(s) Oral daily  sacubitril 24 mG/valsartan 26 mG 1 Tablet(s) Oral two times a day  tamsulosin 0.4 milliGRAM(s) Oral at bedtime    MEDICATIONS  (PRN):  dextrose 40% Gel 15 Gram(s) Oral once PRN Blood Glucose LESS THAN 70 milliGRAM(s)/deciliter  glucagon  Injectable 1 milliGRAM(s) IntraMuscular once PRN Glucose LESS THAN 70 milligrams/deciliter      REVIEW OF SYSTEMS:    CONSTITUTIONAL: No weakness, fevers or chills  EYES/ENT: No visual changes;  No vertigo or throat pain   NECK: No pain or stiffness  RESPIRATORY: No cough, wheezing, hemoptysis; No shortness of breath  CARDIOVASCULAR: No chest pain or palpitations  GASTROINTESTINAL: No abdominal or epigastric pain. No nausea, vomiting, or hematemesis; No diarrhea or constipation. No melena or hematochezia.  GENITOURINARY: No dysuria, frequency or hematuria  NEUROLOGICAL: tingling  SKIN: No itching, burning, rashes, or lesions   All other review of systems is negative unless indicated above    Vital Signs Last 24 Hrs  T(C): 36.8 (25 Sep 2019 10:41), Max: 36.8 (25 Sep 2019 01:55)  T(F): 98.2 (25 Sep 2019 10:41), Max: 98.2 (25 Sep 2019 01:55)  HR: 55 (25 Sep 2019 10:41) (55 - 91)  BP: 128/76 (25 Sep 2019 10:41) (128/76 - 183/118)  BP(mean): --  RR: 18 (25 Sep 2019 10:41) (18 - 18)  SpO2: 98% (25 Sep 2019 10:41) (95% - 99%)    I&O's Summary      PHYSICAL EXAM:    Constitutional: NAD, awake and alert, well-developed  HEENT: PERR, EOMI,  No oral cyananosis.  Neck:  supple,  No JVD  Respiratory: Breath sounds are clear bilaterally, No wheezing, rales or rhonchi  Cardiovascular: S1 and S2, regular rate and rhythm, no Murmurs, gallops or rubs  Gastrointestinal: Bowel Sounds present, soft, nontender.   Extremities: No peripheral edema. No clubbing or cyanosis.  Vascular: 2+ peripheral pulses  Neurological: A/O x 3, no focal deficits  Musculoskeletal: no calf tenderness.  Skin: No rashes.      LABS: All Labs Reviewed:                        14.1   6.02  )-----------( 105      ( 25 Sep 2019 07:38 )             42.6                         14.4   6.46  )-----------( 114      ( 24 Sep 2019 20:11 )             43.4     25 Sep 2019 07:38    142    |  110    |  21     ----------------------------<  94     3.7     |  26     |  1.20   24 Sep 2019 20:11    140    |  106    |  25     ----------------------------<  123    3.9     |  23     |  1.38     Ca    8.8        25 Sep 2019 07:38  Ca    9.4        24 Sep 2019 20:11    TPro  6.3    /  Alb  3.4    /  TBili  0.6    /  DBili  x      /  AST  22     /  ALT  25     /  AlkPhos  64     25 Sep 2019 07:38  TPro  7.1    /  Alb  3.7    /  TBili  0.5    /  DBili  x      /  AST  24     /  ALT  29     /  AlkPhos  84     24 Sep 2019 20:11    PT/INR - ( 24 Sep 2019 20:11 )   PT: 10.5 sec;   INR: 0.95 ratio         PTT - ( 24 Sep 2019 20:11 )  PTT:30.7 sec  CARDIAC MARKERS ( 25 Sep 2019 07:38 )  0.360 ng/mL / x     / x     / x     / x      CARDIAC MARKERS ( 24 Sep 2019 23:26 )  0.215 ng/mL / x     / x     / x     / x      CARDIAC MARKERS ( 24 Sep 2019 20:11 )  0.183 ng/mL / x     / x     / x     / x          Blood Culture:         RADIOLOGY/EKG:  NSR LBBB

## 2019-09-25 NOTE — PROGRESS NOTE ADULT - ASSESSMENT
74 year old male pt with CAD presenting with chest pain and associated dizziness/headache    Chest pain, R/O ACS  -currently asymptomatic  -EKG noted for LBBB. No comparison EKG on file  -NPO for NM stress test in am  -trend trop   -serial EKGs  -f/u TTE  -Cardiology consult pending    CAD  -on ASA, statin and BB    Dizziness  -currently asymptomatic  -CT head negative for acute pathology but age indeterminate infarct noted    Presumed CHF  -f/u echo  -on entresto    HTN  -on BB and Imdur    JADE  -baseline kidney function unknown  -Id/c fluids  -trend kidney function    HIV  -on anti-retroviral    BPH  -on tamsulosin and finasteride    DM2  -hold oral hypoglycemics  -ISS    Advanced directives  -full code    DVT ppx  -heparin sq 74 year old male pt with CAD presenting with chest pain and associated dizziness/headache    Chest pain, R/O ACS  -currently asymptomatic  -EKG noted for LBBB  -f/u previous EKG for comparison   -NPO for NM stress test in am  -trend trops   -serial EKGs  -TTE notable for EF of 55%, minimal abnormal septal wall motion c/w BBB  -Cardiology consult appreciated    CAD  - ASA 81mg  - Atorvastatin 40mg PO QD  - Carvedilol 3.125mg PO BID    Dizziness  -currently asymptomatic  -CT head negative for acute pathology but age indeterminate infarct noted    Presumed CHF  -ECHO results noted  -on Entresto BID    HTN  -Cont carvedilol  -Cont isosorbide mononitrate ER 30mg PO QD    JADE  -trend kidney function  -IFV d/c'd    HIV  -Cont ART    BPH  -Cont finasteride 5mg po qd  -cont tamsulosin 0.4mg po qd    DM2  -hold oral hypoglycemics  -ISS    Advanced directives  -full code    DVT ppx  -heparin sq

## 2019-09-25 NOTE — CHART NOTE - NSCHARTNOTEFT_GEN_A_CORE
Pt seen and examined with house staff.  Plan formulated and reviewed on rounds     Briefly, 75 y/o male with HIV and DM admitted with CP and LBBB.  He is being treated for "HFrEF" and had cardiac angio 3/19 for CP--No stents.  He presents with CP and L arm pain and not feeling himself after drinking ETOH yesterday.  TTE (9/19)--EF 55%   he is free of any Sx since admission  Stable vitals  NAD  Awake and alert    Atypcial CP    For stress test  Cards eval--?? cont HF meds  Cont with anti virals  DVT prophy--OOB    Tele monitoring

## 2019-09-25 NOTE — PATIENT PROFILE ADULT - FALL HARM RISK CONCLUSION
Implemented All Universal Safety Interventions:  [place]right to call system. Call bell, personal items and telephone within reach. Instruct patient to call for assistance. Room bathroom lighting operational. Non-slip footwear when patient is off stretcher. Physically safe environment: no spills, clutter or unnecessary equipment. Stretcher in lowest position, wheels locked, appropriate side rails in place.
Fall with Harm Risk

## 2019-09-25 NOTE — CONSULT NOTE ADULT - PROBLEM SELECTOR RECOMMENDATION 9
Pt denies on history. Reported Cath in 3/19 which was non obstructive. Nuclear ETT scheduled for tomorrow

## 2019-09-25 NOTE — PHARMACOTHERAPY INTERVENTION NOTE - COMMENTS
Completed medication history. Spoke with patient and contacted outpatient pharmacy: [facility] #[phone](spoke with Elham)

## 2019-09-26 ENCOUNTER — TRANSCRIPTION ENCOUNTER (OUTPATIENT)
Age: 74
End: 2019-09-26

## 2019-09-26 VITALS — DIASTOLIC BLOOD PRESSURE: 66 MMHG | HEART RATE: 77 BPM | SYSTOLIC BLOOD PRESSURE: 115 MMHG | TEMPERATURE: 98 F

## 2019-09-26 DIAGNOSIS — R74.8 ABNORMAL LEVELS OF OTHER SERUM ENZYMES: ICD-10-CM

## 2019-09-26 DIAGNOSIS — Z86.79 PERSONAL HISTORY OF OTHER DISEASES OF THE CIRCULATORY SYSTEM: ICD-10-CM

## 2019-09-26 LAB
ANION GAP SERPL CALC-SCNC: 7 MMOL/L — SIGNIFICANT CHANGE UP (ref 5–17)
BUN SERPL-MCNC: 29 MG/DL — HIGH (ref 7–23)
CALCIUM SERPL-MCNC: 9.1 MG/DL — SIGNIFICANT CHANGE UP (ref 8.5–10.1)
CHLORIDE SERPL-SCNC: 110 MMOL/L — HIGH (ref 96–108)
CO2 SERPL-SCNC: 25 MMOL/L — SIGNIFICANT CHANGE UP (ref 22–31)
CREAT SERPL-MCNC: 1.24 MG/DL — SIGNIFICANT CHANGE UP (ref 0.5–1.3)
CULTURE RESULTS: NO GROWTH — SIGNIFICANT CHANGE UP
GLUCOSE SERPL-MCNC: 95 MG/DL — SIGNIFICANT CHANGE UP (ref 70–99)
HCT VFR BLD CALC: 40.5 % — SIGNIFICANT CHANGE UP (ref 39–50)
HGB BLD-MCNC: 13.4 G/DL — SIGNIFICANT CHANGE UP (ref 13–17)
MCHC RBC-ENTMCNC: 33.1 GM/DL — SIGNIFICANT CHANGE UP (ref 32–36)
MCHC RBC-ENTMCNC: 33.5 PG — SIGNIFICANT CHANGE UP (ref 27–34)
MCV RBC AUTO: 101.3 FL — HIGH (ref 80–100)
PLATELET # BLD AUTO: 106 K/UL — LOW (ref 150–400)
POTASSIUM SERPL-MCNC: 3.9 MMOL/L — SIGNIFICANT CHANGE UP (ref 3.5–5.3)
POTASSIUM SERPL-SCNC: 3.9 MMOL/L — SIGNIFICANT CHANGE UP (ref 3.5–5.3)
RBC # BLD: 4 M/UL — LOW (ref 4.2–5.8)
RBC # FLD: 13.7 % — SIGNIFICANT CHANGE UP (ref 10.3–14.5)
SODIUM SERPL-SCNC: 142 MMOL/L — SIGNIFICANT CHANGE UP (ref 135–145)
SPECIMEN SOURCE: SIGNIFICANT CHANGE UP
TROPONIN I SERPL-MCNC: 0.35 NG/ML — HIGH (ref 0.01–0.04)
WBC # BLD: 5 K/UL — SIGNIFICANT CHANGE UP (ref 3.8–10.5)
WBC # FLD AUTO: 5 K/UL — SIGNIFICANT CHANGE UP (ref 3.8–10.5)

## 2019-09-26 PROCEDURE — 78452 HT MUSCLE IMAGE SPECT MULT: CPT | Mod: 26

## 2019-09-26 PROCEDURE — 99232 SBSQ HOSP IP/OBS MODERATE 35: CPT

## 2019-09-26 RX ORDER — DOLUTEGRAVIR SODIUM 25 MG/1
50 TABLET, FILM COATED ORAL DAILY
Refills: 0 | Status: DISCONTINUED | OUTPATIENT
Start: 2019-09-26 | End: 2019-09-26

## 2019-09-26 RX ORDER — FINASTERIDE 5 MG/1
5 TABLET, FILM COATED ORAL DAILY
Refills: 0 | Status: DISCONTINUED | OUTPATIENT
Start: 2019-09-26 | End: 2019-09-26

## 2019-09-26 RX ORDER — SACUBITRIL AND VALSARTAN 24; 26 MG/1; MG/1
1 TABLET, FILM COATED ORAL
Refills: 0 | Status: DISCONTINUED | OUTPATIENT
Start: 2019-09-26 | End: 2019-09-26

## 2019-09-26 RX ORDER — REGADENOSON 0.08 MG/ML
0.4 INJECTION, SOLUTION INTRAVENOUS ONCE
Refills: 0 | Status: COMPLETED | OUTPATIENT
Start: 2019-09-26 | End: 2019-09-26

## 2019-09-26 RX ORDER — LOPERAMIDE HCL 2 MG
2 TABLET ORAL
Refills: 0 | Status: DISCONTINUED | OUTPATIENT
Start: 2019-09-26 | End: 2019-09-26

## 2019-09-26 RX ORDER — LOPERAMIDE HCL 2 MG
2 TABLET ORAL EVERY 4 HOURS
Refills: 0 | Status: DISCONTINUED | OUTPATIENT
Start: 2019-09-26 | End: 2019-09-26

## 2019-09-26 RX ADMIN — DARUNAVIR 800 MILLIGRAM(S): 75 TABLET, FILM COATED ORAL at 14:50

## 2019-09-26 RX ADMIN — REGADENOSON 0.4 MILLIGRAM(S): 0.08 INJECTION, SOLUTION INTRAVENOUS at 09:57

## 2019-09-26 RX ADMIN — ISOSORBIDE MONONITRATE 30 MILLIGRAM(S): 60 TABLET, EXTENDED RELEASE ORAL at 14:49

## 2019-09-26 RX ADMIN — Medication 300 MILLIGRAM(S): at 14:51

## 2019-09-26 RX ADMIN — CARVEDILOL PHOSPHATE 3.12 MILLIGRAM(S): 80 CAPSULE, EXTENDED RELEASE ORAL at 14:51

## 2019-09-26 RX ADMIN — HEPARIN SODIUM 5000 UNIT(S): 5000 INJECTION INTRAVENOUS; SUBCUTANEOUS at 06:01

## 2019-09-26 RX ADMIN — SACUBITRIL AND VALSARTAN 1 TABLET(S): 24; 26 TABLET, FILM COATED ORAL at 14:50

## 2019-09-26 RX ADMIN — Medication 81 MILLIGRAM(S): at 14:49

## 2019-09-26 NOTE — PROGRESS NOTE ADULT - PROBLEM SELECTOR PLAN 1
No ischemia on stress testing; no anginal symptoms; possible apical infarct (fixed perfusion defect).  Ok to d/c home with outpatient f/u.

## 2019-09-26 NOTE — DISCHARGE NOTE PROVIDER - PROVIDER TOKENS
FREE:[LAST:[Syros],FIRST:[Amaris],PHONE:[phone]],FAX:[phone]   [phone]    -],ADDRESS:[[address] [place] [address], [facility]],FOLLOWUP:[1 week]]

## 2019-09-26 NOTE — DISCHARGE NOTE NURSING/CASE MANAGEMENT/SOCIAL WORK - PATIENT PORTAL LINK FT
You can access the FollowMyHealth Patient Portal offered by [facility] by registering at the following website: [email or web address]. By joining [employer]’s FollowMyHealth portal, you will also be able to view your health information using other applications (apps) compatible with our system.

## 2019-09-26 NOTE — PROGRESS NOTE ADULT - SUBJECTIVE AND OBJECTIVE BOX
REASON FOR VISIT: Rx management; s/p nuclear stress test    HPI:  74 year old man with a history of cardiac cath (reportedly revealing normal coronary arteries), HTN, HLD, and DM admitted 9/24/19 with complaints of weakness, chills, dizziness, headache, weakness, lower extremity tingling.  Cardiology was consulted due to elevated troponin.    9/26/19:  Feels well; multiple symptoms that led to this admission have resolved; ambulating - no angina.    MEDICATIONS  (STANDING):  aspirin  chewable 81 milliGRAM(s) Oral daily  atorvastatin Oral Tab/Cap - Peds 40 milliGRAM(s) Oral daily  carvedilol Oral Tab/Cap - Peds 3.125 milliGRAM(s) Oral two times a day  darunavir 800 milliGRAM(s) Oral daily  dolutegravir 50 milliGRAM(s) Oral daily  finasteride 5 milliGRAM(s) Oral daily  heparin  Injectable 5000 Unit(s) SubCutaneous two times a day  influenza   Vaccine 0.5 milliLiter(s) IntraMuscular once  insulin lispro (HumaLOG) corrective regimen sliding scale   SubCutaneous three times a day before meals  isosorbide   mononitrate ER Tablet (IMDUR) 30 milliGRAM(s) Oral daily  lamiVUDine 300 milliGRAM(s) Oral daily  regadenoson Injectable 0.4 milliGRAM(s) IV Push once  ritonavir Tablet 100 milliGRAM(s) Oral daily  sacubitril 24 mG/valsartan 26 mG 1 Tablet(s) Oral two times a day  tamsulosin 0.4 milliGRAM(s) Oral at bedtime    Vital Signs Last 24 Hrs  T(C): 36.9 (26 Sep 2019 05:50), Max: 36.9 (26 Sep 2019 05:50)  T(F): 98.4 (26 Sep 2019 05:50), Max: 98.4 (26 Sep 2019 05:50)  HR: 47 (26 Sep 2019 05:50) (47 - 61)  BP: 117/66 (26 Sep 2019 05:50) (101/58 - 117/66)  RR: 18 (26 Sep 2019 05:50) (18 - 18)  SpO2: 95% (26 Sep 2019 05:50) (94% - 95%)    PHYSICAL EXAM:  Constitutional: NAD, awake and alert  Respiratory: Breath sounds are clear bilaterally, Nonlabored  Cardiovascular: S1 and S2, regular  Gastrointestinal: Bowel Sounds present, soft, nontender.   Extremities: No peripheral edema.    LABS:   CARDIAC MARKERS ( 26 Sep 2019 08:13 ) 0.351 ng/mL / x     / x     / x     / x      CARDIAC MARKERS ( 25 Sep 2019 07:38 ) 0.360 ng/mL / x     / x     / x     / x      CARDIAC MARKERS ( 24 Sep 2019 23:26 ) 0.215 ng/mL / x     / x     / x     / x      CARDIAC MARKERS ( 24 Sep 2019 20:11 ) 0.183 ng/mL / x     / x     / x     / x                          13.4   5.00  )-----------( 106      ( 26 Sep 2019 08:13 )             40.5     142  |  110<H>  |  29<H>  ----------------------------<  95  3.9   |  25  |  1.24    Nuclear Stress Pharmacologic Multiple (09.26.19 @ 11:00):  SPECT Myocardial Perfusion Imaging demonstrates:  Fixed perfusion defect in the septal wall of the left ventricle with reduced contractility and wall thickening. No scan evidence of reversible perfusion defects. Normal left ventricular ejection fraction of 62%    Transthoracic Echocardiogram (09.24.19 @ 22:40):  The left ventricle is normal in size and wall thickness. Estimated left ventricular ejection fraction is 55 %. Minimal abnormal septal wall motion c/w bundle branch block. Trace pericardial effusion cannot be ruled out.

## 2019-09-26 NOTE — DISCHARGE NOTE PROVIDER - HOSPITAL COURSE
Patient is a 74 year old male patient with HTN, HLD, HIV  who presented to the ED complaining of sudden onset chest pain while at rest. Patient endorsed a constant left sided chest pressure associated with dizziness, headache weakness and lower extremity tingling. Trop collected at the time were collected and trended from a Troponin I 0.183 to .351 in a 24 hr span and was seen by our in house cardiologists. The cardiologist reccommended the patient undergo a stress test that revealed             < from: NM Nuclear Stress Pharmacologic Multiple (09.26.19 @ 11:00) >        IMPRESSION: SPECT Myocardial Perfusion Imaging demonstrates:        Fixed perfusion defect in the septal wall of the left ventricle with     reduced contractility and wall thickening.         No scan evidence of reversible perfusion defects.        Normal left ventricular ejection fraction of 62% (Normal: 50% or greater).        < from: Transthoracic Echocardiogram (09.24.19 @ 22:40) >         Summary         The left ventricle is normal in size and wall thickness.     Estimated left ventricular ejection fraction is 55 %.     Minimal abnormal septal wall motion c/w bundle branch block.     Trace pericardial effusion cannot be ruled out         Signature                                                      PCP and Cardiologist both in [facility] Patient is a 74 year old male patient with HTN, HLD, HIV  who presented to the ED complaining of sudden onset chest pain while at rest. Patient endorsed a constant left sided chest pressure associated with dizziness, headache weakness and lower extremity tingling. Trop collected at the time were collected and trended from a Troponin I 0.183 to .351 in a 24 hr span and was seen by our in house cardiologists. The patient was evaluated with a TTE and results revealed a normal ejection fraction with a minimal abnormal septal defect in the left wall motion consistent with the LBBB seen on EKG(on admission). The cardiologist recommended the patient undergo a NM Stress test and it did not show a reversible perfersion defect that could be treated with a heart cath. The patient's chest pain and other associated symptoms resolved with supportive care. Patient reported feeling well and ready to go home at the day of discharge. Of note patient was found to have thrombocytopenia on routine lab tests and told to follow up with his PCP outpatient for additional work up. Patient was seen on the day of discharge 9/26 and was medically stable to be discharged home and told to follow up with his cardiologist and PCP in one week.             Vital Signs Last 24 Hrs    T(C): 36.7 (26 Sep 2019 14:55), Max: 36.9 (26 Sep 2019 05:50)    T(F): 98 (26 Sep 2019 14:55), Max: 98.4 (26 Sep 2019 05:50)    HR: 77 (26 Sep 2019 14:55) (47 - 77)    BP: 115/66 (26 Sep 2019 14:55) (101/58 - 142/68)    RR: 16 (26 Sep 2019 12:00) (16 - 18)    SpO2: 94% (26 Sep 2019 12:00) (94% - 95%)        Vitals    T(F): 98 (09-26-19 @ 14:55), Max: 98.4 (09-26-19 @ 05:50)    HR: 77 (09-26-19 @ 14:55) (47 - 77)    BP: 115/66 (09-26-19 @ 14:55) (101/58 - 142/68)    RR: 16 (09-26-19 @ 12:00) (16 - 18)    SpO2: 94% (09-26-19 @ 12:00) (94% - 95%)        PE    GEN: NAD, comfortable, resting in bed    CV: S1S2, RRR, no mumur    RESP: good air movement, CTABL, no rales, rhonchi or wheezing, respirations unlabored    ABD: +BS, soft, ND, NT, no guarding, no rigidity, no HSM    EXT: +2 radial and pedal pulses, no edema, no calve tenderness    SKIN: No visible Rashes or Ulcers    MSK: ROM intact in all extremities    NEURO:  sensory and CN 2-12 Grossly intact, no motor deficits, AOx3    PSYCH: normal behavior                      NM Nuclear Stress Pharmacologic Multiple (09.26.19 @ 11:00) >        IMPRESSION: SPECT Myocardial Perfusion Imaging demonstrates:        Fixed perfusion defect in the septal wall of the left ventricle with     reduced contractility and wall thickening.         No scan evidence of reversible perfusion defects.        Normal left ventricular ejection fraction of 62% (Normal: 50% or greater).            Transthoracic Echocardiogram (09.24.19 @ 22:40) >         Summary         The left ventricle is normal in size and wall thickness.     Estimated left ventricular ejection fraction is 55 %.     Minimal abnormal septal wall motion c/w bundle branch block.     Trace pericardial effusion cannot be ruled out                                                              PCP and Cardiologist both in [facility] Patient is a 74 year old male patient with HTN, HLD, HIV  who presented to the ED on the night of 9/24 complaining of sudden onset chest pain while at rest. Patient endorsed a constant left sided chest pressure associated with dizziness, headache weakness and lower extremity tingling. Trop collected at the time were collected and trended from a Troponin I 0.183 to .351 in a 24 hr span. The patient was immediately evaluated by our in house cardiologists and a TTE was done. The TTE revealed a normal ejection fraction of 55% with a minimal abnormal septal defect in the left wall motion consistent with the LBBB seen on his admitting EKG. They opted to have the patient undergo a NM Stress test the following morning and it did not show a acute reversible perfusion defect that could be treated with a heart cath. The patient's chest pain and other associated symptoms resolved with supportive care. Patient reported feeling well and ready to go home at the day of discharge. Of note patient was found to have thrombocytopenia on routine lab tests and told to follow up with his PCP outpatient for additional work up. Patient was seen on the day of discharge 9/26 and was medically stable to be discharged home and told to follow up with his cardiologist and PCP in one week. I called the patient's Cardiologists and left a detailed message about the patient's hospital course and medical condition on discharge.              Vital Signs Last 24 Hrs    T(C): 36.7 (26 Sep 2019 14:55), Max: 36.9 (26 Sep 2019 05:50)    T(F): 98 (26 Sep 2019 14:55), Max: 98.4 (26 Sep 2019 05:50)    HR: 77 (26 Sep 2019 14:55) (47 - 77)    BP: 115/66 (26 Sep 2019 14:55) (101/58 - 142/68)    RR: 16 (26 Sep 2019 12:00) (16 - 18)    SpO2: 94% (26 Sep 2019 12:00) (94% - 95%)        Vitals    T(F): 98 (09-26-19 @ 14:55), Max: 98.4 (09-26-19 @ 05:50)    HR: 77 (09-26-19 @ 14:55) (47 - 77)    BP: 115/66 (09-26-19 @ 14:55) (101/58 - 142/68)    RR: 16 (09-26-19 @ 12:00) (16 - 18)    SpO2: 94% (09-26-19 @ 12:00) (94% - 95%)        PE    GEN: NAD, comfortable, resting in bed    CV: S1S2, RRR, no mumur    RESP: good air movement, CTABL, no rales, rhonchi or wheezing, respirations unlabored    ABD: +BS, soft, ND, NT, no guarding, no rigidity, no HSM    EXT: +2 radial and pedal pulses, no edema, no calve tenderness    SKIN: No visible Rashes or Ulcers    MSK: ROM intact in all extremities    NEURO:  sensory and CN 2-12 Grossly intact, no motor deficits, AOx3    PSYCH: normal behavior                      NM Nuclear Stress Pharmacologic Multiple (09.26.19 @ 11:00) >        IMPRESSION: SPECT Myocardial Perfusion Imaging demonstrates:        Fixed perfusion defect in the septal wall of the left ventricle with     reduced contractility and wall thickening.         No scan evidence of reversible perfusion defects.        Normal left ventricular ejection fraction of 62% (Normal: 50% or greater).            Transthoracic Echocardiogram (09.24.19 @ 22:40) >         Summary         The left ventricle is normal in size and wall thickness.     Estimated left ventricular ejection fraction is 55 %.     Minimal abnormal septal wall motion c/w bundle branch block.     Trace pericardial effusion cannot be ruled out

## 2019-09-26 NOTE — CHART NOTE - NSCHARTNOTEFT_GEN_A_CORE
Pt seen and examined with house staff.  Plan formulated and reviewed on rounds      Briefly, 73 y/o male with HIV and DM admitted with CP and LBBB.  He is being treated for "HFrEF" and had cardiac angio 3/19 for CP--No stents.  He presents with CP and L arm pain and not feeling himself after drinking ETOH yesterday.  TTE (9/19)--EF 55%   He is free of any Sx since admission    Cardiac stress test with fixed lesion--case d/w dr brunner    Stable vitals  NAD  Awake and alert    Atypcial CP    DC home with present Rx--will follow up with own cardiologist  Cont with anti virals    DC home

## 2019-09-26 NOTE — DISCHARGE NOTE PROVIDER - CARE PROVIDER_API CALL
Amaris Bruner  [address], [place], [address]  Phone: [phone]  Fax: (   )    -  Follow Up Time: 1 week Amaris Bruner  [address], [place], [address]  Phone: [phone]  Fax: (   )    -  Follow Up Time: 1 week

## 2019-09-26 NOTE — PROGRESS NOTE ADULT - PROBLEM SELECTOR PLAN 3
Mr. Cardona has been treated for an apparent cardiomyopathy with CoregMylessto -- LV function normal on tests performed this admission (? improvement from past with medical therapy); patient intends to f/u with his cardiologist at [facility]

## 2019-09-26 NOTE — DISCHARGE NOTE PROVIDER - NSDCCPCAREPLAN_GEN_ALL_CORE_FT
PRINCIPAL DISCHARGE DIAGNOSIS  Diagnosis: Chest pain, unspecified type  Assessment and Plan of Treatment: You were admitted and underwent work up for acute coronary syndrome. The TTE and the nuclear medicine stress test did not reveal an acute abnormality that could be reversed with catherization.  Please follow up with your PCP and cardiologist for continued care.      SECONDARY DISCHARGE DIAGNOSES  Diagnosis: History of cardiomyopathy  Assessment and Plan of Treatment: You have been treated for an apparent cardiomyopathy with coreg, entresto. Your left ventricle function was normal on admission. [facility]

## 2019-09-30 DIAGNOSIS — N40.0 BENIGN PROSTATIC HYPERPLASIA WITHOUT LOWER URINARY TRACT SYMPTOMS: ICD-10-CM

## 2019-09-30 DIAGNOSIS — I44.7 LEFT BUNDLE-BRANCH BLOCK, UNSPECIFIED: ICD-10-CM

## 2019-09-30 DIAGNOSIS — I42.9 CARDIOMYOPATHY, UNSPECIFIED: ICD-10-CM

## 2019-09-30 DIAGNOSIS — E78.5 HYPERLIPIDEMIA, UNSPECIFIED: ICD-10-CM

## 2019-09-30 DIAGNOSIS — E11.9 TYPE 2 DIABETES MELLITUS WITHOUT COMPLICATIONS: ICD-10-CM

## 2019-09-30 DIAGNOSIS — I11.0 HYPERTENSIVE HEART DISEASE WITH HEART FAILURE: ICD-10-CM

## 2019-09-30 DIAGNOSIS — R07.9 CHEST PAIN, UNSPECIFIED: ICD-10-CM

## 2019-09-30 DIAGNOSIS — I50.22 CHRONIC SYSTOLIC (CONGESTIVE) HEART FAILURE: ICD-10-CM

## 2019-09-30 DIAGNOSIS — Z21 ASYMPTOMATIC HUMAN IMMUNODEFICIENCY VIRUS [HIV] INFECTION STATUS: ICD-10-CM

## 2019-09-30 DIAGNOSIS — N17.9 ACUTE KIDNEY FAILURE, UNSPECIFIED: ICD-10-CM

## 2019-09-30 DIAGNOSIS — I25.10 ATHEROSCLEROTIC HEART DISEASE OF NATIVE CORONARY ARTERY WITHOUT ANGINA PECTORIS: ICD-10-CM

## 2019-09-30 LAB
CULTURE RESULTS: SIGNIFICANT CHANGE UP
SPECIMEN SOURCE: SIGNIFICANT CHANGE UP
